# Patient Record
Sex: FEMALE | Race: WHITE | URBAN - METROPOLITAN AREA
[De-identification: names, ages, dates, MRNs, and addresses within clinical notes are randomized per-mention and may not be internally consistent; named-entity substitution may affect disease eponyms.]

---

## 2019-04-25 ENCOUNTER — OFFICE VISIT (OUTPATIENT)
Dept: DERMATOLOGY | Age: 29
End: 2019-04-25
Payer: COMMERCIAL

## 2019-04-25 DIAGNOSIS — Z78.9 NON-TOBACCO USER: ICD-10-CM

## 2019-04-25 DIAGNOSIS — L73.9 FOLLICULITIS: Primary | ICD-10-CM

## 2019-04-25 DIAGNOSIS — L85.8 KERATOSIS PILARIS: ICD-10-CM

## 2019-04-25 PROCEDURE — 99203 OFFICE O/P NEW LOW 30 MIN: CPT | Performed by: DERMATOLOGY

## 2019-04-25 RX ORDER — CLINDAMYCIN PHOSPHATE 10 UG/ML
LOTION TOPICAL
Qty: 60 ML | Refills: 2 | Status: SHIPPED | OUTPATIENT
Start: 2019-04-25 | End: 2020-01-17

## 2019-04-25 RX ORDER — FLUTICASONE PROPIONATE 50 MCG
1 SPRAY, SUSPENSION (ML) NASAL
COMMUNITY

## 2019-04-25 NOTE — PROGRESS NOTES
[facility]) Dermatology  Bret Gee DO, [address]       Nargis Batista  1990    25 y.o. female     Date of Visit: 2019    Chief Complaint:   Chief Complaint   Patient presents with    New Patient    Acne     back, legs and buttocks, duration several years        I was asked to see this patient by Dr. [place] ref. provider found. History of Present Illness:  Nargis Batista is a 25 y.o. female who presents with the chief complaint of establish care and for the followin. Patient presents today for \"acne\" to her back, buttocks, legs that have been present off and on for 3 years at least.  Does exercise frequently and hikes long as well, attempts to shower after every time she sweats but is a consistent about it. Does not always wear loose cotton fitted clothing. No prior treatment. Denies painful cystic-like bumps. Denies involvement of her face, chest, neck. 2.  Complains of rough feeling bumps and skin to her bilateral upper arms with mild redness since childhood. Denies hx of childhood eczema. No prior treatments. Review of Systems:  Constitutional: Reports general sense of well-being   Skin: No new or changing moles, no history of keloids or hypertrophic scars. Heme: No abnormal bruising or bleeding. Past Medical History, Family History, Surgical History, Medications and Allergies reviewed. Past Skin Hx:   Patient denies past history of melanoma, NMSC, dysplastic nevi, or chronic skin rashes. PFHx: Denies hx of MM or NMSC    History reviewed. No pertinent family history. Past Medical History:   Diagnosis Date    Depression     Seizures ([facility] [address]      History reviewed. No pertinent surgical history.     No Known Allergies  Outpatient Medications Marked as Taking for the 19 encounter (Office Visit) with Bret Gee DO   Medication Sig Dispense Refill    sertraline (ZOLOFT) 50 MG tablet Take 50 mg by mouth      fluticasone (FLONASE) 50 MCG/ACT nasal spray 1 spray by Nasal route      clindamycin (CLEOCIN T) 1 % lotion Apply to affected areas bid for 2 weeks, then once daily as needed for maintenance 60 mL 2    Levonorgestrel-Ethinyl Estrad (AVIANE PO) Take  by mouth. Social History:   Social History     Socioeconomic History    Marital status: Single     Spouse name: Not on file    Number of children: Not on file    Years of education: Not on file    Highest education level: Not on file   Occupational History    Not on file   Social Needs    Financial resource strain: Not on file    Food insecurity:     Worry: Not on file     Inability: Not on file    Transportation needs:     Medical: Not on file     Non-medical: Not on file   Tobacco Use    Smoking status: Never Smoker    Smokeless tobacco: Never Used   Substance and Sexual Activity    Alcohol use: Yes     Comment: rare    Drug use: No    Sexual activity: Not on file   Lifestyle    Physical activity:     Days per week: Not on file     Minutes per session: Not on file    Stress: Not on file   Relationships    Social connections:     Talks on phone: Not on file     Gets together: Not on file     Attends Zoroastrianism service: Not on file     Active member of club or organization: Not on file     Attends meetings of clubs or organizations: Not on file     Relationship status: Not on file    Intimate partner violence:     Fear of current or ex partner: Not on file     Emotionally abused: Not on file     Physically abused: Not on file     Forced sexual activity: Not on file   Other Topics Concern    Not on file   Social History Narrative    Not on file       Physical Examination     The following were examined and determined to be normal: Psych/Neuro, Head/face, Conjunctivae/eyelids, Gums/teeth/lips, Neck and Breast/axilla/chest.    The following were examined and determined to be abnormal: Back, RUE, LUE, RLE and LLE. -General: NAD, well-nourished, well-developed.   Areas of skin examined as listed above:   1.  5-10 inflammatory follicular-based papules and pustules located to back, 2-3 follicular based inflammatory papules located to buttocks, 3-5 inflammatory follicular based papules located to bilateral posterior thighs, ultimately grouped reddish-brown macules of postinflammatory hyperpigmentation located to back, buttocks, posterior thighs; face, chest, anterior thighs-clear  2. Bilateral upper arms-umerous monomorphic pinhead sized 1 to 2mm follicular papules with background patches of mild erythema    Assessment and Plan     1. Folliculitis    2. Keratosis pilaris    3. Non-tobacco user        1. Folliculitis  Mild to moderate severity  -Counseled patient that folliculitis occurs due to inflammation of the superficial hair follicle resulting in follicularly centered bumps. Etiology is variable although in an immunocompetent person, bacterial folliculitis caused by Staph or Strep species are commonly implicated. -Avoid friction from tight-fitting clothing and shave in direction of hair growth. -Recommended to patient to wash body daily (avoid face, eyes, ears, mouth, nose, and mucous membranes) with Hibiclens soap x 1 week, then at least weekly for maintenance. -Apply topical clindamycin 1% lotion to affected areas on body twice daily x 2 weeks, then once daily prn for maintenance    RTC PRN    2. Keratosis pilaris  -Edu patient re: benign, genetic predisposition, chronic course  -Bathe arms daily with lukewarm water and CeraVe SA wash and moisturize BID PRN with Cerave moisturizer SA preferred to smooth bumps  RTC PRN    3. Non-tobacco user  -continue with tobacco cessation        Note is transcribed using voice recognition software. Inadvertent computerized transcription errors may be present. Return if symptoms worsen or fail to improve.